# Patient Record
Sex: MALE | Race: ASIAN | NOT HISPANIC OR LATINO | Employment: OTHER | URBAN - METROPOLITAN AREA
[De-identification: names, ages, dates, MRNs, and addresses within clinical notes are randomized per-mention and may not be internally consistent; named-entity substitution may affect disease eponyms.]

---

## 2021-06-16 PROBLEM — R29.90 STROKE-LIKE SYMPTOMS: Status: ACTIVE | Noted: 2019-06-28

## 2021-06-16 PROBLEM — R07.9 CHEST PAIN: Status: ACTIVE | Noted: 2019-06-27

## 2021-06-16 PROBLEM — R73.9 HYPERGLYCEMIA: Status: ACTIVE | Noted: 2019-06-28

## 2022-01-05 ENCOUNTER — TRANSFERRED RECORDS (OUTPATIENT)
Dept: HEALTH INFORMATION MANAGEMENT | Facility: CLINIC | Age: 57
End: 2022-01-05

## 2022-01-05 LAB — PHQ9 SCORE: 18

## 2023-12-26 ENCOUNTER — HOSPITAL ENCOUNTER (EMERGENCY)
Facility: HOSPITAL | Age: 58
Discharge: HOME | End: 2023-12-26
Attending: FAMILY MEDICINE | Admitting: FAMILY MEDICINE
Payer: MEDICARE

## 2023-12-26 VITALS
BODY MASS INDEX: 16.56 KG/M2 | SYSTOLIC BLOOD PRESSURE: 113 MMHG | DIASTOLIC BLOOD PRESSURE: 77 MMHG | RESPIRATION RATE: 16 BRPM | HEIGHT: 64 IN | HEART RATE: 74 BPM | WEIGHT: 97 LBS | TEMPERATURE: 97.9 F | OXYGEN SATURATION: 97 %

## 2023-12-26 DIAGNOSIS — E11.65 POORLY CONTROLLED TYPE 2 DIABETES MELLITUS (H): ICD-10-CM

## 2023-12-26 LAB
ALBUMIN SERPL BCG-MCNC: 4.6 G/DL (ref 3.5–5.2)
ALBUMIN UR-MCNC: NEGATIVE MG/DL
ALP SERPL-CCNC: 122 U/L (ref 40–150)
ALT SERPL W P-5'-P-CCNC: 27 U/L (ref 0–70)
ANION GAP SERPL CALCULATED.3IONS-SCNC: 9 MMOL/L (ref 7–15)
APPEARANCE UR: CLEAR
AST SERPL W P-5'-P-CCNC: 18 U/L (ref 0–45)
BASOPHILS # BLD AUTO: 0 10E3/UL (ref 0–0.2)
BASOPHILS NFR BLD AUTO: 1 %
BILIRUB DIRECT SERPL-MCNC: <0.2 MG/DL (ref 0–0.3)
BILIRUB SERPL-MCNC: 0.6 MG/DL
BILIRUB UR QL STRIP: NEGATIVE
BUN SERPL-MCNC: 17.9 MG/DL (ref 6–20)
CALCIUM SERPL-MCNC: 9.7 MG/DL (ref 8.6–10)
CHLORIDE SERPL-SCNC: 99 MMOL/L (ref 98–107)
COLOR UR AUTO: COLORLESS
CREAT SERPL-MCNC: 0.64 MG/DL (ref 0.67–1.17)
DEPRECATED HCO3 PLAS-SCNC: 28 MMOL/L (ref 22–29)
EGFRCR SERPLBLD CKD-EPI 2021: >90 ML/MIN/1.73M2
EOSINOPHIL # BLD AUTO: 0.1 10E3/UL (ref 0–0.7)
EOSINOPHIL NFR BLD AUTO: 1 %
ERYTHROCYTE [DISTWIDTH] IN BLOOD BY AUTOMATED COUNT: 12.2 % (ref 10–15)
GLUCOSE BLDC GLUCOMTR-MCNC: 329 MG/DL (ref 70–99)
GLUCOSE BLDC GLUCOMTR-MCNC: 531 MG/DL (ref 70–99)
GLUCOSE SERPL-MCNC: 508 MG/DL (ref 70–99)
GLUCOSE UR STRIP-MCNC: >1000 MG/DL
HBA1C MFR BLD: 13.2 %
HCT VFR BLD AUTO: 44.5 % (ref 40–53)
HGB BLD-MCNC: 15.7 G/DL (ref 13.3–17.7)
HGB UR QL STRIP: NEGATIVE
IMM GRANULOCYTES # BLD: 0 10E3/UL
IMM GRANULOCYTES NFR BLD: 0 %
KETONES UR STRIP-MCNC: NEGATIVE MG/DL
LEUKOCYTE ESTERASE UR QL STRIP: NEGATIVE
LYMPHOCYTES # BLD AUTO: 1.8 10E3/UL (ref 0.8–5.3)
LYMPHOCYTES NFR BLD AUTO: 35 %
MAGNESIUM SERPL-MCNC: 2.1 MG/DL (ref 1.7–2.3)
MCH RBC QN AUTO: 30.9 PG (ref 26.5–33)
MCHC RBC AUTO-ENTMCNC: 35.3 G/DL (ref 31.5–36.5)
MCV RBC AUTO: 88 FL (ref 78–100)
MONOCYTES # BLD AUTO: 0.4 10E3/UL (ref 0–1.3)
MONOCYTES NFR BLD AUTO: 7 %
NEUTROPHILS # BLD AUTO: 2.8 10E3/UL (ref 1.6–8.3)
NEUTROPHILS NFR BLD AUTO: 56 %
NITRATE UR QL: NEGATIVE
NRBC # BLD AUTO: 0 10E3/UL
NRBC BLD AUTO-RTO: 0 /100
PH UR STRIP: 5.5 [PH] (ref 5–7)
PLATELET # BLD AUTO: 178 10E3/UL (ref 150–450)
POTASSIUM SERPL-SCNC: 4.8 MMOL/L (ref 3.4–5.3)
PROT SERPL-MCNC: 7.6 G/DL (ref 6.4–8.3)
RBC # BLD AUTO: 5.08 10E6/UL (ref 4.4–5.9)
RBC URINE: <1 /HPF
SODIUM SERPL-SCNC: 136 MMOL/L (ref 135–145)
SP GR UR STRIP: 1 (ref 1–1.03)
SQUAMOUS EPITHELIAL: <1 /HPF
UROBILINOGEN UR STRIP-MCNC: <2 MG/DL
WBC # BLD AUTO: 5 10E3/UL (ref 4–11)
WBC URINE: <1 /HPF

## 2023-12-26 PROCEDURE — 96374 THER/PROPH/DIAG INJ IV PUSH: CPT

## 2023-12-26 PROCEDURE — 82962 GLUCOSE BLOOD TEST: CPT

## 2023-12-26 PROCEDURE — 83735 ASSAY OF MAGNESIUM: CPT | Performed by: FAMILY MEDICINE

## 2023-12-26 PROCEDURE — 250N000012 HC RX MED GY IP 250 OP 636 PS 637: Performed by: FAMILY MEDICINE

## 2023-12-26 PROCEDURE — 85025 COMPLETE CBC W/AUTO DIFF WBC: CPT | Performed by: FAMILY MEDICINE

## 2023-12-26 PROCEDURE — 80076 HEPATIC FUNCTION PANEL: CPT | Performed by: FAMILY MEDICINE

## 2023-12-26 PROCEDURE — 96361 HYDRATE IV INFUSION ADD-ON: CPT

## 2023-12-26 PROCEDURE — 99284 EMERGENCY DEPT VISIT MOD MDM: CPT | Mod: 25

## 2023-12-26 PROCEDURE — 258N000003 HC RX IP 258 OP 636: Performed by: FAMILY MEDICINE

## 2023-12-26 PROCEDURE — 36415 COLL VENOUS BLD VENIPUNCTURE: CPT | Performed by: FAMILY MEDICINE

## 2023-12-26 PROCEDURE — 96372 THER/PROPH/DIAG INJ SC/IM: CPT | Mod: XS

## 2023-12-26 PROCEDURE — 81001 URINALYSIS AUTO W/SCOPE: CPT | Performed by: FAMILY MEDICINE

## 2023-12-26 PROCEDURE — 83036 HEMOGLOBIN GLYCOSYLATED A1C: CPT | Performed by: FAMILY MEDICINE

## 2023-12-26 PROCEDURE — 80048 BASIC METABOLIC PNL TOTAL CA: CPT | Performed by: FAMILY MEDICINE

## 2023-12-26 RX ADMIN — SODIUM CHLORIDE 6 UNITS: 9 INJECTION, SOLUTION INTRAVENOUS at 14:17

## 2023-12-26 RX ADMIN — SODIUM CHLORIDE 1000 ML: 9 INJECTION, SOLUTION INTRAVENOUS at 12:14

## 2023-12-26 RX ADMIN — INSULIN GLARGINE 8 UNITS: 100 INJECTION, SOLUTION SUBCUTANEOUS at 14:58

## 2023-12-26 ASSESSMENT — ACTIVITIES OF DAILY LIVING (ADL)
ADLS_ACUITY_SCORE: 35
ADLS_ACUITY_SCORE: 35

## 2023-12-26 NOTE — PHARMACY-ADMISSION MEDICATION HISTORY
Pharmacist Admission Medication History    Admission medication history is complete. The information provided in this note is only as accurate as the sources available at the time of the update.    Information Source(s): Patient, Clinic records, and CareEverywhere/SureScripts via in-person    Pertinent Information: new start exenatide last month    Changes made to PTA medication list:  Added: exenatide  Deleted: Lantus  Changed: Januvia to Tradjenta     Medication Affordability:       Allergies reviewed with patient and updates made in EHR: yes    Medication History Completed By: Romel Curtis [facility] 12/26/2023 1:21 PM    Prior to Admission medications    Medication Sig Last Dose Taking? Auth Provider Long Term End Date   acetaminophen (TYLENOL) 500 MG tablet [ACETAMINOPHEN (TYLENOL) 500 MG TABLET] Take 500-1,000 mg by mouth 4 (four) times a day as needed for pain.  Yes Provider, Historical     aspirin 81 MG EC tablet [ASPIRIN 81 MG EC TABLET] Take 81 mg by mouth daily. 12/25/2023 Yes Provider, Historical     atorvastatin (LIPITOR) 20 MG tablet [ATORVASTATIN (LIPITOR) 20 MG TABLET] Take 20 mg by mouth daily. 12/25/2023 Yes Provider, Historical Yes    DULoxetine (CYMBALTA) 20 MG capsule [DULOXETINE (CYMBALTA) 20 MG CAPSULE] Take 20 mg by mouth at bedtime. 12/25/2023 Yes Provider, Historical     exenatide ER (BYDUREON BCISE) 2 MG/0.85ML auto-injector Inject 2 mg Subcutaneous every 7 days  Yes Unknown, Entered By History Yes    linagliptin (TRADJENTA) 5 MG TABS tablet Take 5 mg by mouth daily 12/25/2023 Yes Unknown, Entered By History Yes    meloxicam (MOBIC) 15 MG tablet [MELOXICAM (MOBIC) 15 MG TABLET] Take 15 mg by mouth daily. 12/25/2023 Yes Provider, Historical     metFORMIN (GLUCOPHAGE) 500 MG tablet [METFORMIN (GLUCOPHAGE) 500 MG TABLET] Take 1,000 mg by mouth 2 (two) times a day with meals. 12/25/2023 Yes Provider, Historical Yes    omeprazole (PRILOSEC) 20 MG capsule [OMEPRAZOLE (PRILOSEC) 20 MG CAPSULE]  Take 1 capsule (20 mg total) by mouth daily before breakfast. 12/25/2023 Yes Vineet Leslie MD     QUEtiapine (SEROQUEL) 100 MG tablet [QUETIAPINE (SEROQUEL) 100 MG TABLET] Take 100 mg by mouth at bedtime. 12/25/2023 Yes Provider, Historical Yes    sertraline (ZOLOFT) 100 MG tablet [SERTRALINE (ZOLOFT) 100 MG TABLET] Take 100 mg by mouth daily. 12/25/2023 Yes Provider, Historical     tenofovir disoproxil (VIREAD) 300 mg tablet [TENOFOVIR DISOPROXIL (VIREAD) 300 MG TABLET] Take 300 mg by mouth daily. 12/25/2023 Yes Provider, Historical

## 2023-12-26 NOTE — ED TRIAGE NOTES
Patient presents here for evaluation of blurring of vision, weight loss and fatigue. He was seen by his primary provider and was told that his blood sugar was very elevated. Blood glucose in triage is 531

## 2023-12-26 NOTE — ED PROVIDER NOTES
"EMERGENCY DEPARTMENT ENCOUNTER      NAME: Flaco Alexandre  AGE: 58 year old male  YOB: 1965  MRN: [id number]  EVALUATION DATE & TIME: 12/26/2023 11:54 AM    PCP: Bruna Odom    ED PROVIDER: Tiburcio Cheney M.D.    Chief Complaint   Patient presents with    Hyperglycemia       FINAL IMPRESSION:  1. Poorly controlled type 2 diabetes mellitus (H)        ED COURSE & MEDICAL DECISION MAKING:    Pertinent Labs & Imaging studies independently interpreted by me. (See chart for details)  12:34 PM  Patient seen and examined, external records reviewed.  Patient presents today with blurred vision, and fatigue going on for couple of months.  Denies chest pain, shortness of breath.  Does have frequent urination but no dysuria.  Reports that he currently takes metformin as well as \"once a week insulin.\"  On exam here, patient is hyperglycemic.  No focal findings on exam.  Labs ordered and independently interpreted by me with normal creatinine and normal basic metabolic panel other than blood glucose of 508 with hemoglobin A1c of 13.2 consistent with patient's long history of symptoms.  CBC is reassuring without evidence for infection or anemia, urinalysis normal other than hyperglycemia.  Will discuss with pharmacist to review medications and make adjustments, patient is stable for discharge.  IV fluids are being given in the emergency department.  No evidence for DKA or HHS.  1:27 PM reviewed medication reconciliation by pharmacy, patient takes exenatide, linagliptin, metformin for his diabetes.  Patient reports he is taking his medications as prescribed.  Will initiate patient on long-acting insulin and close follow-up with primary care clinic for continued longitudinal treatment of his diabetes  2:59 PM contacted patient's clinic over an hour ago, no callback yet.  Patient will be discharged with improved blood glucose and insulin for home    At the conclusion of the encounter I discussed the results of all of the " "tests and the disposition. The questions were answered. The patient or family acknowledged understanding and was agreeable with the care plan.     Medical Decision Making    History:  Supplemental history from: Documented in chart, if applicable  External Record(s) reviewed: Documented in chart, if applicable.    Work Up:  Chart documentation includes differential considered and any EKGs or imaging independently interpreted by provider, where specified.  In additional to work up documented, I considered the following work up: Documented in chart, if applicable.    External consultation:  Discussion of management with another provider: Documented in chart, if applicable    Complicating factors:  Care impacted by chronic illness: Diabetes and Hypertension  Care affected by social determinants of health: Access to Medical Care    Disposition considerations: Discharge. I prescribed additional prescription strength medication(s) as charted. I considered admission, but discharged patient after significant clinical improvement.    MEDICATIONS GIVEN IN THE EMERGENCY:  Medications   sodium chloride 0.9% BOLUS 1,000 mL (0 mLs Intravenous Stopped 12/26/23 1322)   insulin regular 1 unit/mL injection 6 Units (6 Units Intravenous $Given 12/26/23 1417)   insulin glargine (LANTUS PEN) injection 8 Units (8 Units Subcutaneous $Given 12/26/23 1458)       NEW PRESCRIPTIONS STARTED AT TODAY'S ER VISIT  New Prescriptions    INSULIN GLARGINE (LANTUS PEN) 100 UNIT/ML PEN    Inject 8 Units Subcutaneous at bedtime       =================================================================    HPI    Patient information was obtained from: patient with [employer]      Flaco Alexandre is a 58 year old male with a pertinent history of diabetes who presents to this ED for evaluation of high blood sugar.  Patient reports that he has had fatigue, blurry vision and frequent urination for several months.  Patient reports he in on metformin, \"once a week " "insulin\" but does not know his other medications.  Says he is taking       REVIEW OF SYSTEMS   Review of Systems   All other systems reviewed and negative    PAST MEDICAL HISTORY:  No past medical history on file.    PAST SURGICAL HISTORY:  No past surgical history on file.    CURRENT MEDICATIONS:    No current facility-administered medications for this encounter.     Current Outpatient Medications   Medication    acetaminophen (TYLENOL) 500 MG tablet    aspirin 81 MG EC tablet    atorvastatin (LIPITOR) 20 MG tablet    DULoxetine (CYMBALTA) 20 MG capsule    exenatide ER (BYDUREON BCISE) 2 MG/0.85ML auto-injector    insulin glargine (LANTUS PEN) 100 UNIT/ML pen    linagliptin (TRADJENTA) 5 MG TABS tablet    meloxicam (MOBIC) 15 MG tablet    metFORMIN (GLUCOPHAGE) 500 MG tablet    omeprazole (PRILOSEC) 20 MG capsule    QUEtiapine (SEROQUEL) 100 MG tablet    sertraline (ZOLOFT) 100 MG tablet    tenofovir disoproxil (VIREAD) 300 mg tablet       ALLERGIES:  No Known Allergies    FAMILY HISTORY:  No family history on file.    SOCIAL HISTORY:   Social History     Socioeconomic History    Marital status:    Tobacco Use    Smoking status: Never    Smokeless tobacco: Never   Substance and Sexual Activity    Alcohol use: Not Currently    Drug use: Never       VITALS:  BP 98/69   Pulse 77   Temp 97.9  F (36.6  C) (Oral)   Resp 20   Ht 1.626 m (5' 4\")   Wt 44 kg (97 lb)   SpO2 97%   BMI 16.65 kg/m      PHYSICAL EXAM:  Physical Exam  Vitals and nursing note reviewed.   Constitutional:       Appearance: Normal appearance.   HENT:      Head: Normocephalic and atraumatic.      Right Ear: External ear normal.      Left Ear: External ear normal.      Nose: Nose normal.      Mouth/Throat:      Mouth: Mucous membranes are moist.   Eyes:      Extraocular Movements: Extraocular movements intact.      Conjunctiva/sclera: Conjunctivae normal.      Pupils: Pupils are equal, round, and reactive to light.   Cardiovascular:      " Rate and Rhythm: Normal rate and regular rhythm.   Pulmonary:      Effort: Pulmonary effort is normal.      Breath sounds: Normal breath sounds. No wheezing or rales.   Abdominal:      General: Abdomen is flat. There is no distension.      Palpations: Abdomen is soft.      Tenderness: There is no abdominal tenderness. There is no guarding.   Musculoskeletal:         General: Normal range of motion.      Cervical back: Normal range of motion and neck supple.      Right lower leg: No edema.      Left lower leg: No edema.   Lymphadenopathy:      Cervical: No cervical adenopathy.   Skin:     General: Skin is warm and dry.   Neurological:      General: No focal deficit present.      Mental Status: He is alert and oriented to person, place, and time. Mental status is at baseline.      Comments: No gross focal neurologic deficits   Psychiatric:         Mood and Affect: Mood normal.         Behavior: Behavior normal.         Thought Content: Thought content normal.          LAB:  All pertinent labs reviewed and interpreted.  Results for orders placed or performed during the hospital encounter of 12/26/23   Basic metabolic panel   Result Value Ref Range    Sodium 136 135 - 145 mmol/L    Potassium 4.8 3.4 - 5.3 mmol/L    Chloride 99 98 - 107 mmol/L    Carbon Dioxide (CO2) 28 22 - 29 mmol/L    Anion Gap 9 7 - 15 mmol/L    Urea Nitrogen 17.9 6.0 - 20.0 mg/dL    Creatinine 0.64 (L) 0.67 - 1.17 mg/dL    GFR Estimate >90 >60 mL/min/1.73m2    Calcium 9.7 8.6 - 10.0 mg/dL    Glucose 508 (HH) 70 - 99 mg/dL   UA with Microscopic reflex to Culture    Specimen: Urine, Clean Catch   Result Value Ref Range    Color Urine Colorless Colorless, Straw, Light Yellow, Yellow    Appearance Urine Clear Clear    Glucose Urine >1000 (A) Negative mg/dL    Bilirubin Urine Negative Negative    Ketones Urine Negative Negative mg/dL    Specific Gravity Urine 1.005 1.001 - 1.030    Blood Urine Negative Negative    pH Urine 5.5 5.0 - 7.0    Protein  Albumin Urine Negative Negative mg/dL    Urobilinogen Urine <2.0 <2.0 mg/dL    Nitrite Urine Negative Negative    Leukocyte Esterase Urine Negative Negative    RBC Urine <1 <=2 /HPF    WBC Urine <1 <=5 /HPF    Squamous Epithelials Urine <1 <=1 /HPF   Glucose by meter   Result Value Ref Range    GLUCOSE BY METER POCT 531 (HH) 70 - 99 mg/dL   CBC with platelets and differential   Result Value Ref Range    WBC Count 5.0 4.0 - 11.0 10e3/uL    RBC Count 5.08 4.40 - 5.90 10e6/uL    Hemoglobin 15.7 13.3 - 17.7 g/dL    Hematocrit 44.5 40.0 - 53.0 %    MCV 88 78 - 100 fL    MCH 30.9 26.5 - 33.0 pg    MCHC 35.3 31.5 - 36.5 g/dL    RDW 12.2 10.0 - 15.0 %    Platelet Count 178 150 - 450 10e3/uL    % Neutrophils 56 %    % Lymphocytes 35 %    % Monocytes 7 %    % Eosinophils 1 %    % Basophils 1 %    % Immature Granulocytes 0 %    NRBCs per 100 WBC 0 <1 /100    Absolute Neutrophils 2.8 1.6 - 8.3 10e3/uL    Absolute Lymphocytes 1.8 0.8 - 5.3 10e3/uL    Absolute Monocytes 0.4 0.0 - 1.3 10e3/uL    Absolute Eosinophils 0.1 0.0 - 0.7 10e3/uL    Absolute Basophils 0.0 0.0 - 0.2 10e3/uL    Absolute Immature Granulocytes 0.0 <=0.4 10e3/uL    Absolute NRBCs 0.0 10e3/uL   Result Value Ref Range    Hemoglobin A1C 13.2 (H) <5.7 %   Result Value Ref Range    Magnesium 2.1 1.7 - 2.3 mg/dL   Hepatic function panel   Result Value Ref Range    Protein Total 7.6 6.4 - 8.3 g/dL    Albumin 4.6 3.5 - 5.2 g/dL    Bilirubin Total 0.6 <=1.2 mg/dL    Alkaline Phosphatase 122 40 - 150 U/L    AST 18 0 - 45 U/L    ALT 27 0 - 70 U/L    Bilirubin Direct <0.20 0.00 - 0.30 mg/dL   Glucose by meter   Result Value Ref Range    GLUCOSE BY METER POCT 329 (H) 70 - 99 mg/dL       RADIOLOGY:  Reviewed all pertinent imaging. Please see official radiology report.  No orders to display     Tiburcio Cheney M.D.  Emergency Medicine  [facility] EMERGENCY DEPARTMENT  [address]  [id number]  [phone]  Dept: [phone]       Tiburcio Cheney MD  12/26/23 1434

## 2023-12-26 NOTE — ED NOTES
Expected Patient Referral to ED  10:29 AM    Referring Clinic/Provider:  [facility]     Reason for referral/Clinical facts:  58-year-old male with a history of diabetes seen in clinic today for routine preop physical.  Patient complains of fatigue.  Patient was noted to have a blood sugar than 400.  Patient being sent to the ED for further workup.        Recommendations provided:  Send to ED for further evaluation    Caller was informed that this institution does possess the capabilities and/or resources to provide for patient and should be transferred to our facility.    Discussed that if direct admit is sought and any hurdles are encountered, this ED would be happy to see the patient and evaluate.    Informed caller that recommendations provided are recommendations based only on the facts provided and that they responsible to accept or reject the advice, or to seek a formal in person consultation as needed and that this ED will see/treat patient should they arrive.      Nixon Schmidt DO  [facility] EMERGENCY DEPARTMENT  [address] [place] [id number]  [phone]       Nixon Schmidt DO  12/26/23 1032